# Patient Record
Sex: FEMALE | Race: WHITE | NOT HISPANIC OR LATINO | Employment: UNEMPLOYED | URBAN - NONMETROPOLITAN AREA
[De-identification: names, ages, dates, MRNs, and addresses within clinical notes are randomized per-mention and may not be internally consistent; named-entity substitution may affect disease eponyms.]

---

## 2019-05-24 ENCOUNTER — APPOINTMENT (OUTPATIENT)
Dept: ULTRASOUND IMAGING | Facility: HOSPITAL | Age: 24
End: 2019-05-24

## 2019-05-24 ENCOUNTER — APPOINTMENT (OUTPATIENT)
Dept: CT IMAGING | Facility: HOSPITAL | Age: 24
End: 2019-05-24

## 2019-05-24 ENCOUNTER — HOSPITAL ENCOUNTER (EMERGENCY)
Facility: HOSPITAL | Age: 24
Discharge: HOME | End: 2019-05-24
Attending: EMERGENCY MEDICINE | Admitting: EMERGENCY MEDICINE

## 2019-05-24 VITALS
RESPIRATION RATE: 20 BRPM | SYSTOLIC BLOOD PRESSURE: 130 MMHG | HEIGHT: 64 IN | TEMPERATURE: 98.3 F | BODY MASS INDEX: 25.61 KG/M2 | DIASTOLIC BLOOD PRESSURE: 89 MMHG | OXYGEN SATURATION: 99 % | WEIGHT: 150 LBS | HEART RATE: 80 BPM

## 2019-05-24 DIAGNOSIS — N83.201 BILATERAL OVARIAN CYSTS: Primary | ICD-10-CM

## 2019-05-24 DIAGNOSIS — N83.202 BILATERAL OVARIAN CYSTS: Primary | ICD-10-CM

## 2019-05-24 LAB
ALBUMIN SERPL-MCNC: 4 G/DL (ref 3.5–5)
ALBUMIN/GLOB SERPL: 1.3 G/DL (ref 1–2)
ALP SERPL-CCNC: 61 U/L (ref 38–126)
ALT SERPL W P-5'-P-CCNC: 37 U/L (ref 13–69)
ANION GAP SERPL CALCULATED.3IONS-SCNC: 12.6 MMOL/L (ref 10–20)
AST SERPL-CCNC: 23 U/L (ref 15–46)
B-HCG UR QL: NEGATIVE
BACTERIA UR QL AUTO: ABNORMAL /HPF
BASOPHILS # BLD AUTO: 0.05 10*3/MM3 (ref 0–0.2)
BASOPHILS NFR BLD AUTO: 0.7 % (ref 0–1.5)
BILIRUB SERPL-MCNC: 0.3 MG/DL (ref 0.2–1.3)
BILIRUB UR QL STRIP: NEGATIVE
BUN BLD-MCNC: 17 MG/DL (ref 7–20)
BUN/CREAT SERPL: 21.3 (ref 7.1–23.5)
CALCIUM SPEC-SCNC: 8.5 MG/DL (ref 8.4–10.2)
CHLORIDE SERPL-SCNC: 102 MMOL/L (ref 98–107)
CLARITY UR: CLEAR
CO2 SERPL-SCNC: 27 MMOL/L (ref 26–30)
COLOR UR: YELLOW
CREAT BLD-MCNC: 0.8 MG/DL (ref 0.6–1.3)
DEPRECATED RDW RBC AUTO: 41.1 FL (ref 37–54)
EOSINOPHIL # BLD AUTO: 0.11 10*3/MM3 (ref 0–0.4)
EOSINOPHIL NFR BLD AUTO: 1.5 % (ref 0.3–6.2)
ERYTHROCYTE [DISTWIDTH] IN BLOOD BY AUTOMATED COUNT: 11.9 % (ref 12.3–15.4)
GFR SERPL CREATININE-BSD FRML MDRD: 87 ML/MIN/1.73
GLOBULIN UR ELPH-MCNC: 3.1 GM/DL
GLUCOSE BLD-MCNC: 82 MG/DL (ref 74–98)
GLUCOSE UR STRIP-MCNC: NEGATIVE MG/DL
HCT VFR BLD AUTO: 38.9 % (ref 34–46.6)
HGB BLD-MCNC: 13.1 G/DL (ref 12–15.9)
HGB UR QL STRIP.AUTO: ABNORMAL
HYALINE CASTS UR QL AUTO: ABNORMAL /LPF
IMM GRANULOCYTES # BLD AUTO: 0.01 10*3/MM3 (ref 0–0.05)
IMM GRANULOCYTES NFR BLD AUTO: 0.1 % (ref 0–0.5)
KETONES UR QL STRIP: NEGATIVE
LEUKOCYTE ESTERASE UR QL STRIP.AUTO: ABNORMAL
LYMPHOCYTES # BLD AUTO: 1.68 10*3/MM3 (ref 0.7–3.1)
LYMPHOCYTES NFR BLD AUTO: 23.5 % (ref 19.6–45.3)
MCH RBC QN AUTO: 31.6 PG (ref 26.6–33)
MCHC RBC AUTO-ENTMCNC: 33.7 G/DL (ref 31.5–35.7)
MCV RBC AUTO: 94 FL (ref 79–97)
MONOCYTES # BLD AUTO: 0.63 10*3/MM3 (ref 0.1–0.9)
MONOCYTES NFR BLD AUTO: 8.8 % (ref 5–12)
NEUTROPHILS # BLD AUTO: 4.67 10*3/MM3 (ref 1.7–7)
NEUTROPHILS NFR BLD AUTO: 65.4 % (ref 42.7–76)
NITRITE UR QL STRIP: NEGATIVE
NRBC BLD AUTO-RTO: 0 /100 WBC (ref 0–0.2)
PH UR STRIP.AUTO: 8.5 [PH] (ref 5–8)
PLATELET # BLD AUTO: 289 10*3/MM3 (ref 140–450)
PMV BLD AUTO: 9.6 FL (ref 6–12)
POTASSIUM BLD-SCNC: 3.6 MMOL/L (ref 3.5–5.1)
PROT SERPL-MCNC: 7.1 G/DL (ref 6.3–8.2)
PROT UR QL STRIP: NEGATIVE
RBC # BLD AUTO: 4.14 10*6/MM3 (ref 3.77–5.28)
RBC # UR: ABNORMAL /HPF
REF LAB TEST METHOD: ABNORMAL
SODIUM BLD-SCNC: 138 MMOL/L (ref 137–145)
SP GR UR STRIP: 1.01 (ref 1–1.03)
SQUAMOUS #/AREA URNS HPF: ABNORMAL /HPF
UROBILINOGEN UR QL STRIP: ABNORMAL
WBC NRBC COR # BLD: 7.15 10*3/MM3 (ref 3.4–10.8)
WBC UR QL AUTO: ABNORMAL /HPF

## 2019-05-24 PROCEDURE — 85025 COMPLETE CBC W/AUTO DIFF WBC: CPT | Performed by: EMERGENCY MEDICINE

## 2019-05-24 PROCEDURE — 81025 URINE PREGNANCY TEST: CPT

## 2019-05-24 PROCEDURE — 81001 URINALYSIS AUTO W/SCOPE: CPT

## 2019-05-24 PROCEDURE — 99283 EMERGENCY DEPT VISIT LOW MDM: CPT

## 2019-05-24 PROCEDURE — 76830 TRANSVAGINAL US NON-OB: CPT

## 2019-05-24 PROCEDURE — 96374 THER/PROPH/DIAG INJ IV PUSH: CPT

## 2019-05-24 PROCEDURE — 80053 COMPREHEN METABOLIC PANEL: CPT | Performed by: EMERGENCY MEDICINE

## 2019-05-24 PROCEDURE — 25010000002 KETOROLAC TROMETHAMINE PER 15 MG: Performed by: EMERGENCY MEDICINE

## 2019-05-24 PROCEDURE — 74176 CT ABD & PELVIS W/O CONTRAST: CPT

## 2019-05-24 RX ORDER — RIZATRIPTAN BENZOATE 10 MG/1
10 TABLET ORAL ONCE AS NEEDED
COMMUNITY

## 2019-05-24 RX ORDER — IBUPROFEN 600 MG/1
600 TABLET ORAL EVERY 6 HOURS PRN
Qty: 20 TABLET | Refills: 0 | Status: SHIPPED | OUTPATIENT
Start: 2019-05-24 | End: 2020-12-18

## 2019-05-24 RX ORDER — SODIUM CHLORIDE 0.9 % (FLUSH) 0.9 %
10 SYRINGE (ML) INJECTION AS NEEDED
Status: DISCONTINUED | OUTPATIENT
Start: 2019-05-24 | End: 2019-05-24 | Stop reason: HOSPADM

## 2019-05-24 RX ORDER — HYDROCHLOROTHIAZIDE 12.5 MG/1
25 CAPSULE, GELATIN COATED ORAL DAILY
COMMUNITY
End: 2021-01-13 | Stop reason: SDUPTHER

## 2019-05-24 RX ORDER — HYDROCODONE BITARTRATE AND ACETAMINOPHEN 5; 325 MG/1; MG/1
1 TABLET ORAL ONCE
Status: COMPLETED | OUTPATIENT
Start: 2019-05-24 | End: 2019-05-24

## 2019-05-24 RX ORDER — ACETAMINOPHEN 500 MG
500 TABLET ORAL EVERY 6 HOURS PRN
Qty: 40 TABLET | Refills: 0 | Status: SHIPPED | OUTPATIENT
Start: 2019-05-24 | End: 2020-12-18

## 2019-05-24 RX ORDER — BUPROPION HYDROCHLORIDE 150 MG/1
150 TABLET ORAL DAILY
COMMUNITY

## 2019-05-24 RX ORDER — KETOROLAC TROMETHAMINE 30 MG/ML
30 INJECTION, SOLUTION INTRAMUSCULAR; INTRAVENOUS ONCE
Status: COMPLETED | OUTPATIENT
Start: 2019-05-24 | End: 2019-05-24

## 2019-05-24 RX ADMIN — HYDROCODONE BITARTRATE AND ACETAMINOPHEN 1 TABLET: 5; 325 TABLET ORAL at 06:05

## 2019-05-24 RX ADMIN — KETOROLAC TROMETHAMINE 30 MG: 30 INJECTION, SOLUTION INTRAMUSCULAR; INTRAVENOUS at 05:03

## 2020-12-18 ENCOUNTER — OFFICE VISIT (OUTPATIENT)
Dept: OBSTETRICS AND GYNECOLOGY | Facility: CLINIC | Age: 25
End: 2020-12-18

## 2020-12-18 VITALS
SYSTOLIC BLOOD PRESSURE: 120 MMHG | WEIGHT: 180 LBS | HEIGHT: 64 IN | BODY MASS INDEX: 30.73 KG/M2 | DIASTOLIC BLOOD PRESSURE: 80 MMHG

## 2020-12-18 DIAGNOSIS — Z30.014 ENCOUNTER FOR INITIAL PRESCRIPTION OF INTRAUTERINE CONTRACEPTIVE DEVICE (IUD): Primary | ICD-10-CM

## 2020-12-18 PROBLEM — Z86.69 HX OF MIGRAINES: Status: ACTIVE | Noted: 2020-12-18

## 2020-12-18 PROCEDURE — 99213 OFFICE O/P EST LOW 20 MIN: CPT | Performed by: OBSTETRICS & GYNECOLOGY

## 2020-12-18 NOTE — PROGRESS NOTES
"Chief Complaint   Patient presents with   • Contraception         Subjective   HPI  Emmanuel Torres is a 25 y.o. female, , LMP was on Patient's last menstrual period was 2020 (approximate). who presents for hormonal contraception.    She has been on OCP's  Intermittently the past 5 years and would like an alternative form of contraception.  She has had a tubal and uses OCP's for period control. She was unhappy on depo in the past.  She has been on this pill the longest and had been doing well to regulate periods until recently.  She has had bleeding x 3weeks.     Dysmenorrhea:moderate, occurring premenstrually and first 1-2 days of flow.  Partner Status: Marital Status: .  The patient's current method of contraception is contraceptive methods: OCP (estrogen/progesterone).  She is dissatisfied with her current method of birth control.  She does not report vaginal dryness.  The patient reports additional symptoms as BTB.  .          Additional OB/GYN History   Last Pap :   Last Completed Pap Smear       Status Date      PAP SMEAR Done 2020 neg        History of abnormal Pap smear: no  Exercises Regularly: no  Feelings of Anxiety or Depression: {yes  Tobacco Usage?: No   OB History        3    Para   3    Term   1       2    AB        Living   3       SAB        TAB        Ectopic        Molar        Multiple        Live Births   3                The additional following portions of the patient's history were reviewed and updated as appropriate: allergies, current medications, past family history, past medical history, past social history, past surgical history and problem list.    Review of Systems    I have reviewed and agree with the HPI, ROS, and historical information as entered above. Silvina Quiñones MD    Objective   /80   Ht 162.6 cm (64\")   Wt 81.6 kg (180 lb)   LMP 2020 (Approximate)   Breastfeeding No   BMI 30.90 kg/m²     Physical Exam  Vitals signs and " nursing note reviewed.   Constitutional:       Appearance: Normal appearance.   HENT:      Head: Normocephalic and atraumatic.   Eyes:      General: No scleral icterus.     Pupils: Pupils are equal, round, and reactive to light.   Neck:      Musculoskeletal: Normal range of motion and neck supple.   Pulmonary:      Effort: Pulmonary effort is normal.      Breath sounds: Normal breath sounds.   Abdominal:      General: Bowel sounds are normal. There is no distension.      Palpations: Abdomen is soft.      Tenderness: There is no abdominal tenderness.   Musculoskeletal: Normal range of motion.      Right lower leg: No edema.      Left lower leg: No edema.   Skin:     General: Skin is warm and dry.   Neurological:      General: No focal deficit present.      Mental Status: She is alert and oriented to person, place, and time.   Psychiatric:         Mood and Affect: Mood normal.         Behavior: Behavior normal. Behavior is cooperative.         Assessment/Plan     Assessment     Problem List Items Addressed This Visit     None      Visit Diagnoses     Encounter for initial prescription of intrauterine contraceptive device (IUD)    -  Primary          1. Discussed options for contraception,    Plan     1. Counseling on alternative methods of birth control provided  2. Counseling on use of oral contraceptives provided  3. Counseling on use of IUDs provided   4. She desires mirena, info given, will fu for insertion on cycle      Silvina Quiñones MD  12/18/2020

## 2021-01-06 ENCOUNTER — TELEPHONE (OUTPATIENT)
Dept: OBSTETRICS AND GYNECOLOGY | Facility: CLINIC | Age: 26
End: 2021-01-06

## 2021-01-13 ENCOUNTER — OFFICE VISIT (OUTPATIENT)
Dept: OBSTETRICS AND GYNECOLOGY | Facility: CLINIC | Age: 26
End: 2021-01-13

## 2021-01-13 VITALS
HEIGHT: 64 IN | WEIGHT: 182 LBS | DIASTOLIC BLOOD PRESSURE: 89 MMHG | SYSTOLIC BLOOD PRESSURE: 122 MMHG | BODY MASS INDEX: 31.07 KG/M2

## 2021-01-13 DIAGNOSIS — N92.6 IRREGULAR MENSES: Primary | ICD-10-CM

## 2021-01-13 PROCEDURE — 99213 OFFICE O/P EST LOW 20 MIN: CPT | Performed by: NURSE PRACTITIONER

## 2021-01-13 RX ORDER — HYDROCHLOROTHIAZIDE 25 MG/1
25 TABLET ORAL 2 TIMES DAILY
COMMUNITY
Start: 2020-12-18

## 2021-01-13 RX ORDER — NORGESTIMATE AND ETHINYL ESTRADIOL 0.25-0.035
1 KIT ORAL DAILY
COMMUNITY
Start: 2020-12-18

## 2021-01-13 RX ORDER — ETONOGESTREL AND ETHINYL ESTRADIOL 11.7; 2.7 MG/1; MG/1
1 INSERT, EXTENDED RELEASE VAGINAL
Qty: 1 EACH | Refills: 6 | Status: SHIPPED | OUTPATIENT
Start: 2021-01-13

## 2021-01-13 NOTE — PROGRESS NOTES
Chief Complaint   Patient presents with   • Follow-up   • Menstrual Problem       Subjective   HPI  Emmanuel Torres is a 25 y.o. female, , who presents for switching contraception. She would like to look at other options for period control. She would like to switch due to the irregular cycles that she is having on her current pill. Pt states that sometimes her periods are normal and occur during the placebo week, but other times she starts bleeding the week prior to the placebo week. Pt states that sometimes she just has spotting, and sometimes it is heavy. She is not using for birth control, as she has had a tubal. Pt states she has always had irregular menses. She states that we checked her benefits for a Mirena, but it was not covered and was going to cost $1200. She would like to discuss other options.    She has an appt to see her PCP to have her thyroid checked.       Additional OB/GYN History   Current contraception: Tubal  Last Pap : 2020  Last Completed Pap Smear       Status Date      PAP SMEAR Done 2020 neg        History of abnormal Pap smear: no  Tobacco Usage?: No   OB History        3    Para   3    Term   1       2    AB        Living   3       SAB        TAB        Ectopic        Molar        Multiple        Live Births   3                Health Maintenance   Topic Date Due   • Annual Gynecologic Pelvic and Breast Exam  1993   • ANNUAL PHYSICAL  1996   • INFLUENZA VACCINE  2020   • HEPATITIS C SCREENING  2020   • PAP SMEAR  2023   • TDAP/TD VACCINES (2 - Td) 2027   • Pneumococcal Vaccine 0-64  Aged Out   • MENINGOCOCCAL VACCINE  Aged Out       The additional following portions of the patient's history were reviewed and updated as appropriate: allergies, current medications, past family history, past medical history, past social history, past surgical history and problem list.    Review of Systems   Constitutional: Negative.    HENT:  "Negative.    Eyes: Negative.    Respiratory: Negative.    Cardiovascular: Negative.    Gastrointestinal: Negative.    Endocrine: Negative.    Genitourinary: Positive for menstrual problem.   Musculoskeletal: Negative.    Skin: Negative.    Allergic/Immunologic: Negative.    Neurological: Negative.    Hematological: Negative.    Psychiatric/Behavioral: Negative.        I have reviewed and agree with the HPI, ROS, and historical information as entered above. Viktoria Castillo, APRN    Objective   /89   Ht 162.6 cm (64\")   Wt 82.6 kg (182 lb)   Breastfeeding No   BMI 31.24 kg/m²     Physical Exam  Constitutional:       Appearance: Normal appearance.   HENT:      Head: Normocephalic.   Pulmonary:      Effort: Pulmonary effort is normal.   Neurological:      Mental Status: She is alert and oriented to person, place, and time.   Psychiatric:         Behavior: Behavior normal.         Assessment/Plan     Assessment     Problem List Items Addressed This Visit     None      Visit Diagnoses     Irregular menses    -  Primary          Plan     1. Discussed different options for period control. Unfortunately her insurance won't cover an IUD. Discussed trying a different OCP, a 3 month pill, nuvaring, depo. Pt wants to try Nuvaring. Advised on use, side effects, risks and given teaching sheet. Pt advised to call with any issues.       Viktoria Castillo, NEVA  01/13/2021  "

## 2021-06-02 ENCOUNTER — OFFICE (OUTPATIENT)
Dept: URBAN - METROPOLITAN AREA CLINIC 4 | Facility: CLINIC | Age: 26
End: 2021-06-02

## 2021-06-02 VITALS
SYSTOLIC BLOOD PRESSURE: 112 MMHG | TEMPERATURE: 98.3 F | DIASTOLIC BLOOD PRESSURE: 70 MMHG | WEIGHT: 167 LBS | HEIGHT: 64 IN

## 2021-06-02 DIAGNOSIS — R19.4 CHANGE IN BOWEL HABIT: ICD-10-CM

## 2021-06-02 DIAGNOSIS — R63.0 ANOREXIA: ICD-10-CM

## 2021-06-02 DIAGNOSIS — R10.9 UNSPECIFIED ABDOMINAL PAIN: ICD-10-CM

## 2021-06-02 DIAGNOSIS — Z79.1 LONG TERM (CURRENT) USE OF NON-STEROIDAL ANTI-INFLAMMATORIES: ICD-10-CM

## 2021-06-02 DIAGNOSIS — R11.2 NAUSEA WITH VOMITING, UNSPECIFIED: ICD-10-CM

## 2021-06-02 PROCEDURE — 99244 OFF/OP CNSLTJ NEW/EST MOD 40: CPT | Performed by: NURSE PRACTITIONER

## 2021-06-02 RX ORDER — OMEPRAZOLE 20 MG/1
20 CAPSULE, DELAYED RELEASE ORAL
Qty: 30 | Refills: 5 | Status: ACTIVE
Start: 2021-06-02

## 2021-07-28 ENCOUNTER — ON CAMPUS - OUTPATIENT (OUTPATIENT)
Dept: URBAN - METROPOLITAN AREA HOSPITAL 73 | Facility: HOSPITAL | Age: 26
End: 2021-07-28

## 2021-07-28 DIAGNOSIS — K29.50 UNSPECIFIED CHRONIC GASTRITIS WITHOUT BLEEDING: ICD-10-CM

## 2021-07-28 DIAGNOSIS — R11.0 NAUSEA: ICD-10-CM

## 2021-07-28 DIAGNOSIS — R10.13 EPIGASTRIC PAIN: ICD-10-CM

## 2021-07-28 PROCEDURE — 43239 EGD BIOPSY SINGLE/MULTIPLE: CPT | Performed by: INTERNAL MEDICINE

## 2021-07-29 ENCOUNTER — OFFICE VISIT (OUTPATIENT)
Dept: UROLOGY | Facility: CLINIC | Age: 26
End: 2021-07-29

## 2021-07-29 VITALS
OXYGEN SATURATION: 98 % | TEMPERATURE: 98.4 F | SYSTOLIC BLOOD PRESSURE: 108 MMHG | RESPIRATION RATE: 16 BRPM | HEART RATE: 74 BPM | DIASTOLIC BLOOD PRESSURE: 76 MMHG

## 2021-07-29 DIAGNOSIS — N20.0 CALCULUS OF KIDNEY: Primary | ICD-10-CM

## 2021-07-29 LAB
BILIRUB BLD-MCNC: ABNORMAL MG/DL
CLARITY, POC: CLEAR
COLOR UR: YELLOW
GLUCOSE UR STRIP-MCNC: NEGATIVE MG/DL
KETONES UR QL: NEGATIVE
LEUKOCYTE EST, POC: NEGATIVE
NITRITE UR-MCNC: NEGATIVE MG/ML
PH UR: 6 [PH] (ref 5–8)
PROT UR STRIP-MCNC: ABNORMAL MG/DL
RBC # UR STRIP: ABNORMAL /UL
SP GR UR: 1.03 (ref 1–1.03)
UROBILINOGEN UR QL: NORMAL

## 2021-07-29 PROCEDURE — 99202 OFFICE O/P NEW SF 15 MIN: CPT | Performed by: UROLOGY

## 2021-07-29 PROCEDURE — 81003 URINALYSIS AUTO W/O SCOPE: CPT | Performed by: UROLOGY

## 2021-07-29 RX ORDER — DIPHENOXYLATE HYDROCHLORIDE AND ATROPINE SULFATE 2.5; .025 MG/1; MG/1
TABLET ORAL
COMMUNITY

## 2021-07-29 RX ORDER — HYDROCHLOROTHIAZIDE 50 MG/1
50 TABLET ORAL DAILY
COMMUNITY
Start: 2021-06-02

## 2021-07-29 NOTE — PROGRESS NOTES
Chief Complaint  Kidney Stone    Karyn Moran, DO    HPI  Ms. Torres is a 25 y.o. female who presents for further management of nephrolithiasis.    She presented to her PCP/the ER with flank pain in March 2021 and was diagnosed with punctate nonobstructing left lower pole renal stones. She presents today for follow up.  She is not having any flank pain today..  No fever, chills, nausea, vomiting.  No dysuria.    Stone related history:  Family history of kidney stones  no  Renal disease or anatomic abnormality: no  Malabsorptive disease or gastric bypass: no  Frequent UTI's    no  Parathyroid disease    no    Dietary Considerations  Soda - 1 per day  Fast food - 0 per week  Water - 3-4 glasses per day  no Adds salt to foods    Past Medical History  Past Medical History:   Diagnosis Date   • Anxiety    • Cyst of right ovary    • Depression    • Hyperlipidemia    • Irregular periods    • Kidney stone    • Migraine        Past Surgical History  Past Surgical History:   Procedure Laterality Date   • APPENDECTOMY     • KIDNEY STONE SURGERY     • TUBAL ABDOMINAL LIGATION     • WISDOM TOOTH EXTRACTION         Medications    Current Outpatient Medications:   •  buPROPion XL (WELLBUTRIN XL) 150 MG 24 hr tablet, Take 150 mg by mouth Daily., Disp: , Rfl:   •  Estarylla 0.25-35 MG-MCG per tablet, Take 1 tablet by mouth Daily., Disp: , Rfl:   •  hydroCHLOROthiazide (HYDRODIURIL) 50 MG tablet, Take 50 mg by mouth Daily., Disp: , Rfl:   •  rizatriptan (MAXALT) 10 MG tablet, Take 10 mg by mouth 1 (One) Time As Needed for Migraine. May repeat in 2 hours if needed, Disp: , Rfl:   •  etonogestrel-ethinyl estradiol (NuvaRing) 0.12-0.015 MG/24HR vaginal ring, Insert 1 each into the vagina Every 28 (Twenty-Eight) Days. Insert vaginally and leave in place for 3 consecutive weeks, then remove for 1 week., Disp: 1 each, Rfl: 6  •  hydroCHLOROthiazide (HYDRODIURIL) 25 MG tablet, Take 25 mg by mouth 2 (two) times a day., Disp: , Rfl:    •  multivitamin (MULTI-VITAMIN DAILY PO), TAKE ONE TABLET BY MOUTH ONCE DAILY, Disp: , Rfl:     Allergies  Allergies   Allergen Reactions   • Diphenhydramine Irritability     IV only   • Reglan [Metoclopramide] Irritability       Social History  Social History     Socioeconomic History   • Marital status:      Spouse name: Not on file   • Number of children: Not on file   • Years of education: Not on file   • Highest education level: Not on file   Tobacco Use   • Smoking status: Never Smoker   • Smokeless tobacco: Never Used   Substance and Sexual Activity   • Alcohol use: No   • Drug use: Never   • Sexual activity: Yes     Partners: Male     Birth control/protection: OCP       Family History  Family History   Problem Relation Age of Onset   • Hypertension Other      Review of Systems  A full review of systems was performed and was notable for migraines.    Physical Exam  Visit Vitals  /76   Pulse 74   Temp 98.4 °F (36.9 °C) (Temporal)   Resp 16   SpO2 98%     Physical exam was notable for normal habitus.     Labs  Lab Results   Component Value Date    GLUCOSE 82 05/24/2019    BUN 17 05/24/2019    CREATININE 0.80 05/24/2019    EGFRIFNONA 87 05/24/2019    BCR 21.3 05/24/2019    K 3.6 05/24/2019    CO2 27.0 05/24/2019    CALCIUM 8.5 05/24/2019    ALBUMIN 4.00 05/24/2019    LABIL2 1.4 11/15/2015    AST 23 05/24/2019    ALT 37 05/24/2019       Lab Results   Component Value Date    WBC 7.15 05/24/2019    HGB 13.1 05/24/2019    HCT 38.9 05/24/2019    MCV 94.0 05/24/2019     05/24/2019       No results found for: LDH, URICACID    Lab Results   Component Value Date    CALCIUM 8.5 05/24/2019       Brief Urine Lab Results  (Last result in the past 365 days)      Color   Clarity   Blood   Leuk Est   Nitrite   Protein   CREAT   Urine HCG        07/29/21 1146 Yellow Clear 3+ Negative Negative Trace               No results found for: URINECX    )No components found for: STONEANALYSI      Radiologic  Studies  No Images in the past 120 days found..    I have personally reviewed these labs and images.      Assessment  Ms. Torres is a 25 y.o. female with reported nonobstructing left renal stones.  No acute flank pain or distress today.    Plan  1. FU PRN    I spent a total of 20 minutes with the patient and the chart engaging in data gathering and interpretation, patient interaction, as well as counseling on the risks, benefits, and alternatives of the therapy and coordinating care.

## 2022-09-30 ENCOUNTER — TRANSCRIBE ORDERS (OUTPATIENT)
Dept: ADMINISTRATIVE | Facility: HOSPITAL | Age: 27
End: 2022-09-30

## 2022-09-30 DIAGNOSIS — M25.562 PAIN, JOINT, KNEE, LEFT: Primary | ICD-10-CM

## 2022-10-05 ENCOUNTER — HOSPITAL ENCOUNTER (OUTPATIENT)
Dept: MRI IMAGING | Facility: HOSPITAL | Age: 27
Discharge: HOME | End: 2022-10-05
Admitting: NURSE PRACTITIONER

## 2022-10-05 DIAGNOSIS — M25.562 PAIN, JOINT, KNEE, LEFT: ICD-10-CM

## 2022-10-05 PROCEDURE — 73721 MRI JNT OF LWR EXTRE W/O DYE: CPT

## 2024-02-19 ENCOUNTER — TRANSCRIBE ORDERS (OUTPATIENT)
Dept: PSYCHIATRY | Facility: CLINIC | Age: 29
End: 2024-02-19
Payer: COMMERCIAL

## 2024-08-08 ENCOUNTER — OFFICE VISIT (OUTPATIENT)
Dept: PSYCHIATRY | Facility: CLINIC | Age: 29
End: 2024-08-08
Payer: COMMERCIAL

## 2024-08-08 VITALS
DIASTOLIC BLOOD PRESSURE: 64 MMHG | HEIGHT: 64 IN | SYSTOLIC BLOOD PRESSURE: 102 MMHG | WEIGHT: 148 LBS | HEART RATE: 82 BPM | BODY MASS INDEX: 25.27 KG/M2 | OXYGEN SATURATION: 98 %

## 2024-08-08 DIAGNOSIS — F33.1 MODERATE EPISODE OF RECURRENT MAJOR DEPRESSIVE DISORDER: ICD-10-CM

## 2024-08-08 DIAGNOSIS — F41.1 GENERALIZED ANXIETY DISORDER: Primary | ICD-10-CM

## 2024-08-08 RX ORDER — ONDANSETRON 4 MG/1
TABLET, ORALLY DISINTEGRATING ORAL
COMMUNITY

## 2024-08-08 RX ORDER — IBUPROFEN 800 MG/1
TABLET ORAL
COMMUNITY
Start: 2024-08-01

## 2024-08-08 RX ORDER — ESCITALOPRAM OXALATE 20 MG/1
20 TABLET ORAL DAILY
COMMUNITY

## 2024-08-08 RX ORDER — BUPROPION HYDROCHLORIDE 150 MG/1
150 TABLET ORAL DAILY
Qty: 30 TABLET | Refills: 2 | Status: SHIPPED | OUTPATIENT
Start: 2024-08-08

## 2024-08-08 RX ORDER — BUSPIRONE HYDROCHLORIDE 5 MG/1
TABLET ORAL
COMMUNITY
Start: 2024-06-03 | End: 2024-08-08 | Stop reason: SDUPTHER

## 2024-08-08 RX ORDER — AMITRIPTYLINE HYDROCHLORIDE 25 MG/1
TABLET, FILM COATED ORAL
COMMUNITY
Start: 2024-06-03

## 2024-08-08 RX ORDER — BUSPIRONE HYDROCHLORIDE 10 MG/1
10 TABLET ORAL 2 TIMES DAILY
Qty: 60 TABLET | Refills: 2 | Status: SHIPPED | OUTPATIENT
Start: 2024-08-08
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
## 2025-01-19 DIAGNOSIS — F33.1 MODERATE EPISODE OF RECURRENT MAJOR DEPRESSIVE DISORDER: ICD-10-CM

## 2025-01-19 DIAGNOSIS — F41.1 GENERALIZED ANXIETY DISORDER: ICD-10-CM

## 2025-01-19 NOTE — TELEPHONE ENCOUNTER
Refill request sent thru interface, Patient needs a follow up appointment if continuing medication refills.

## 2025-01-20 RX ORDER — BUPROPION HYDROCHLORIDE 150 MG/1
150 TABLET ORAL DAILY
Qty: 30 TABLET | Refills: 2 | OUTPATIENT
Start: 2025-01-20

## 2025-01-20 NOTE — TELEPHONE ENCOUNTER
Pt's number is not correct in the chart, and does not have [employer] to send a message. Patient was unreachable.

## 2025-01-20 NOTE — TELEPHONE ENCOUNTER
Rx Refill Note  Requested Prescriptions     Refused Prescriptions Disp Refills    buPROPion XL (WELLBUTRIN XL) 150 MG 24 hr tablet [Pharmacy Med Name: bupropion HCl  mg 24 hr tablet, extended release] 30 tablet 2     Sig: TAKE ONE TABLET BY MOUTH ONCE DAILY      Last office visit with prescribing clinician: 8/8/2024   Last telemedicine visit with prescribing clinician: Visit date not found   Next office visit with prescribing clinician: Visit date not found                         Would you like a call back once the refill request has been completed: [] Yes [] No    If the office needs to give you a call back, can they leave a voicemail: [] Yes [] No    Yara Bull, [facility]  01/20/25, 09:52 EST